# Patient Record
Sex: MALE
[De-identification: names, ages, dates, MRNs, and addresses within clinical notes are randomized per-mention and may not be internally consistent; named-entity substitution may affect disease eponyms.]

---

## 2018-10-08 ENCOUNTER — HOSPITAL ENCOUNTER (EMERGENCY)
Dept: HOSPITAL 25 - UCEAST | Age: 48
Discharge: HOME | End: 2018-10-08
Payer: MEDICAID

## 2018-10-08 DIAGNOSIS — I10: ICD-10-CM

## 2018-10-08 DIAGNOSIS — R51: Primary | ICD-10-CM

## 2018-10-08 DIAGNOSIS — F17.210: ICD-10-CM

## 2018-10-08 PROCEDURE — 99202 OFFICE O/P NEW SF 15 MIN: CPT

## 2018-10-08 PROCEDURE — G0463 HOSPITAL OUTPT CLINIC VISIT: HCPCS

## 2018-10-08 PROCEDURE — 70450 CT HEAD/BRAIN W/O DYE: CPT

## 2018-10-08 NOTE — UC
Headache HPI





- HPI Summary


HPI Summary: 





The patient is a 48-year-old male with the fairly acute onset of a headache 

that started about 2 hours prior to arrival.  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 

is a cook).The headache was involving his entire head and associated with some 

unsteadiness of gait and nausea.  He said he felt drunk,He did not vomit.  His 

headache has resolved significantly by the time I got into see him.He took a 

dose of blood pressure medicine prior to arrival and his HA had already started 

to improve on arrive here.  His headache now is mild and is not associated with 

any dizziness or nausea.  Denies any neck pain.  Not had similar headaches in 

the past.  He has a history of high blood pressure but does not know the name 

of the medicine he takes far.  No CP or SOB





- History Of Current Complaint


Chief Complaint: UCHeadache


Stated Complaint: HEAD PAIN


Time Seen by Provider: 10/08/18 17:49


Hx Obtained From: Patient


Onset/Duration: Sudden Onset, Lasting Hours


Onset Of Symptoms: Sudden


Initially Headache Was: "Worst Headache Ever", Initial Pain Scale(0-10)= - 6


Currently Pain Is: Mild


Pain Intensity: 4


Pain Scale Used: 0-10 Numeric


Timing: Constant


Character: Throbbing


Location of Headache: Diffuse


Aggravating Factor(s): Position Change


Allevating Factor(s): Other (Noted In Comments) - spontaneously improving


Associated Signs And Symptoms: Positive: Nausea.  Negative: Vomiting, Sinus 

Pressure, Neck Pain, Neck Stiffness, Decreased LOC, Visual Changes





- Allergies/Home Medications


Allergies/Adverse Reactions: 


 Allergies











Allergy/AdvReac Type Severity Reaction Status Date / Time


 


No Known Allergies Allergy   Verified 10/08/18 16:55











Home Medications: 


 Home Medications





Unobtainable  10/08/18 [History Confirmed 10/08/18]











PMH/Surg Hx/FS Hx/Imm Hx


Previously Healthy: Yes


Cardiovascular History: Hypertension





- Surgical History


Surgical History: None





- Family History


Known Family History: Positive: Hypertension





- Social History


Alcohol Use: None


Substance Use Type: None


Smoking Status (MU): Heavy Every Day Tobacco Smoker





Review of Systems


Constitutional: Negative


Skin: Negative


Eyes: Negative


ENT: Negative


Respiratory: Negative


Cardiovascular: Negative


Gastrointestinal: Nausea - resolved


Genitourinary: Negative


Motor: Negative


Neurovascular: Negative


Musculoskeletal: Negative


Neurological: Headache


Psychological: Negative


All Other Systems Reviewed And Are Negative: Yes





Physical Exam


Triage Information Reviewed: Yes


Appearance: Well-Appearing, No Pain Distress, Well-Nourished


Vital Signs: 


 Initial Vital Signs











Temp  98.0 F   10/08/18 16:48


 


Pulse  73   10/08/18 16:48


 


Resp  16   10/08/18 16:48


 


BP  126/89   10/08/18 16:48


 


Pulse Ox  100   10/08/18 16:48











Vital Signs Reviewed: Yes


Eyes: Positive: Conjunctiva Clear, Other: - PERRL/EOMI


ENT: Positive: Hearing grossly normal.  Negative: Nasal congestion, Nasal 

drainage, Trismus, Muffled voice, Hoarse voice, Sinus tenderness, Uvula midline


Dental Exam: Normal


Neck: Positive: Supple, Nontender, No Lymphadenopathy


Respiratory: Positive: Lungs clear, Normal breath sounds, No respiratory 

distress, No accessory muscle use


Cardiovascular: Positive: RRR, No Murmur


Abdomen Description: Positive: Nontender, No Organomegaly, Soft


Musculoskeletal: Positive: ROM Intact, No Edema


Neurological: Positive: Alert, Other: - CN 2-12 intact, strenght 5/5, sensory 

intact, normal gait, DTRs brisk and symmetrical


Psychological Exam: Normal


Skin Exam: Normal





Re-Evaluation





- Re-Evaluation


  ** First Eval


Re-Evaluation Time: 18:38


Change: Improved


Comment: headache gone but head feel heavy





Headache Course/Dx





- Differential Dx/Diagnosis


Provider Diagnoses: Headache of uncertain cause





Discharge





- Sign-Out/Discharge


Documenting (check all that apply): Patient Departure


All imaging exams completed and their final reports reviewed: Yes





- Discharge Plan


Condition: Stable


Disposition: HOME


Patient Education Materials:  Acute Headache (ED)


Referrals: 


[facility] [place] PHYSICIAN REFERRAL [Outside] - As Soon As Possible


Additional Instructions: 


if headache worsens go straight to the ER





you need to find a local MD





rest


tylenol





IF YOU EVER HAVE ANOTHER HA LIKE THIS GO TO THE ER





- Billing Disposition and Condition


Condition: STABLE


Disposition: Home

## 2018-10-08 NOTE — RAD
EXAM:

  CT Head Without Intravenous Contrast



CLINICAL HISTORY:

  48 years old, male; Pain; Headache; Cluster; Additional info: Sudden onset 

headache today x 2 hours, fells pressure on top of head with dizziness. , 

Denies head trauma



TECHNIQUE:

  Axial computed tomography images of the head/brain without intravenous 

contrast.  All CT scans at this facility use at least one of these dose 

optimization techniques: automated exposure control; mA and/or kV adjustment 

per patient size (includes targeted exams where dose is matched to clinical 

indication); or iterative reconstruction.



COMPARISON:

  No relevant prior studies available.



FINDINGS:

  Brain:  Unremarkable.  No hemorrhage.  No significant white matter disease.  

No edema.

  Ventricles:  Unremarkable.  No ventriculomegaly.

  Bones/joints:  Unremarkable.  No acute fracture.

  Soft tissues:  Unremarkable.

  Sinuses:  Unremarkable as visualized.  No acute sinusitis.

  Mastoid air cells:  Unremarkable as visualized.  No mastoid effusion.



IMPRESSION:     

  1. Normal CT of the brain.



To contact [facility] with a general question: [facility] - [phone]

For direct physician to physician contact: Physician Hotline - [phone]

[facility] ([facility] Facility ID #[id number])